# Patient Record
(demographics unavailable — no encounter records)

---

## 2024-10-07 NOTE — HISTORY OF PRESENT ILLNESS
[FreeTextEntry1] : Pt is a 53 y/o F who is referred here today by their PCP for evaluation.  Recent labs show mildly elevated LDL  Starting 06/2024 she started to get CP, not associated with exertion, lasts a few min, about once a month.  She was seen in ED at [facility].  She states that all testing was "normal" and she was sent home She continues to exercise without symptoms  [place] 03/2020 wnl  PMH: none Smoking status: 30 yrs ago no ETOH no drug use Current exercise: 3-4x/week Daily water intake: >64oz Daily caffeine intake: none OTC medications: MVI, vit D Family hx: mother MVP/AF Previous cardiac testing: none Previous hospitalizations: none 2 children - no problem with pregnancies LMP at age 47

## 2024-10-07 NOTE — DISCUSSION/SUMMARY
[EKG obtained to assist in diagnosis and management of assessed problem(s)] : EKG obtained to assist in diagnosis and management of assessed problem(s) [FreeTextEntry1] : Pt is a 53 y/o F with atypical CP Will check transthoracic echocardiogram to evaluate left ventricular function and assess for any structural abnormalities check CCTA to eval for CAD check labs Advised pt to go to the nearest ED if symptoms persist or worsen VSS The described plan was discussed with the pt.  All questions and concerns were addressed to the best of my knowledge.